# Patient Record
Sex: MALE | Race: WHITE | NOT HISPANIC OR LATINO | URBAN - METROPOLITAN AREA
[De-identification: names, ages, dates, MRNs, and addresses within clinical notes are randomized per-mention and may not be internally consistent; named-entity substitution may affect disease eponyms.]

---

## 2023-07-24 ENCOUNTER — OFFICE (OUTPATIENT)
Dept: URBAN - METROPOLITAN AREA CLINIC 10 | Facility: CLINIC | Age: 58
End: 2023-07-24

## 2023-07-24 VITALS
HEIGHT: 68 IN | SYSTOLIC BLOOD PRESSURE: 123 MMHG | DIASTOLIC BLOOD PRESSURE: 73 MMHG | WEIGHT: 136 LBS | HEART RATE: 56 BPM | OXYGEN SATURATION: 96 %

## 2023-07-24 DIAGNOSIS — D50.9 IRON DEFICIENCY ANEMIA, UNSPECIFIED: ICD-10-CM

## 2023-07-24 PROCEDURE — 99204 OFFICE O/P NEW MOD 45 MIN: CPT | Performed by: REGISTERED NURSE

## 2023-07-24 RX ORDER — POLYETHYLENE GLYCOL 3350, SODIUM SULFATE, SODIUM CHLORIDE, POTASSIUM CHLORIDE, ASCORBIC ACID, SODIUM ASCORBATE 140-9-5.2G
KIT ORAL
Qty: 1 | Refills: 0 | Status: COMPLETED
Start: 2023-07-24 | End: 2023-09-19

## 2023-07-24 NOTE — INTERFACERESULTNOTES
I called the patient to notify him of lab results.  There was no answer so I left him a voice mail.  His iron levels were slightly decreased.  I have sent in iron supplement to [facility] to be taken twice a day.  We will need to add an EGD to his colonoscopy.  I will discuss this once he returns my call

## 2023-07-24 NOTE — INTERFACERESULTNOTES
I called  the patient to review his lab results.   There was no answer so I left him a voicemail. I also want to follow-up make sure that he received notification on the iron supplements.  We will need to add an EGD to his colonoscopy for iron deficiency anemia.

## 2023-07-25 LAB
ANEMIA PROFILE A: BASO (ABSOLUTE): 0.1 X10E3/UL (ref 0–0.2)
ANEMIA PROFILE A: BASOS: 1 %
ANEMIA PROFILE A: EOS (ABSOLUTE): 0.1 X10E3/UL (ref 0–0.4)
ANEMIA PROFILE A: EOS: 1 %
ANEMIA PROFILE A: HEMATOCRIT: 40.1 % (ref 37.5–51)
ANEMIA PROFILE A: HEMOGLOBIN: 12.8 G/DL — LOW (ref 13–17.7)
ANEMIA PROFILE A: IMMATURE GRANS (ABS): 0 X10E3/UL (ref 0–0.1)
ANEMIA PROFILE A: IMMATURE GRANULOCYTES: 0 %
ANEMIA PROFILE A: IRON BIND.CAP.(TIBC): 302 UG/DL (ref 250–450)
ANEMIA PROFILE A: IRON SATURATION: 7 % — CRITICAL LOW (ref 15–55)
ANEMIA PROFILE A: IRON: 20 UG/DL — LOW (ref 38–169)
ANEMIA PROFILE A: LYMPHS (ABSOLUTE): 1.4 X10E3/UL (ref 0.7–3.1)
ANEMIA PROFILE A: LYMPHS: 26 %
ANEMIA PROFILE A: MCH: 28.9 PG (ref 26.6–33)
ANEMIA PROFILE A: MCHC: 31.9 G/DL (ref 31.5–35.7)
ANEMIA PROFILE A: MCV: 91 FL (ref 79–97)
ANEMIA PROFILE A: MONOCYTES(ABSOLUTE): 0.6 X10E3/UL (ref 0.1–0.9)
ANEMIA PROFILE A: MONOCYTES: 11 %
ANEMIA PROFILE A: NEUTROPHILS (ABSOLUTE): 3.2 X10E3/UL (ref 1.4–7)
ANEMIA PROFILE A: NEUTROPHILS: 61 %
ANEMIA PROFILE A: PLATELETS: 269 X10E3/UL (ref 150–450)
ANEMIA PROFILE A: RBC: 4.43 X10E6/UL (ref 4.14–5.8)
ANEMIA PROFILE A: RDW: 13.9 % (ref 11.6–15.4)
ANEMIA PROFILE A: RETICULOCYTE COUNT: 1.7 % (ref 0.6–2.6)
ANEMIA PROFILE A: UIBC: 282 UG/DL (ref 111–343)
ANEMIA PROFILE A: WBC: 5.3 X10E3/UL (ref 3.4–10.8)
VITAMIN B12 AND FOLATE: FOLATE (FOLIC ACID), SERUM: >20 NG/ML
VITAMIN B12 AND FOLATE: VITAMIN B12: 634 PG/ML (ref 232–1245)

## 2023-09-19 ENCOUNTER — AMBULATORY SURGICAL CENTER (OUTPATIENT)
Dept: URBAN - METROPOLITAN AREA SURGERY 1 | Facility: SURGERY | Age: 58
End: 2023-09-19
Payer: COMMERCIAL

## 2023-09-19 ENCOUNTER — AMBULATORY SURGICAL CENTER (OUTPATIENT)
Dept: URBAN - METROPOLITAN AREA SURGERY 1 | Facility: SURGERY | Age: 58
End: 2023-09-19

## 2023-09-19 ENCOUNTER — OFFICE (OUTPATIENT)
Dept: URBAN - METROPOLITAN AREA PATHOLOGY 12 | Facility: PATHOLOGY | Age: 58
End: 2023-09-19
Payer: COMMERCIAL

## 2023-09-19 VITALS
HEART RATE: 73 BPM | OXYGEN SATURATION: 98 % | SYSTOLIC BLOOD PRESSURE: 112 MMHG | SYSTOLIC BLOOD PRESSURE: 127 MMHG | SYSTOLIC BLOOD PRESSURE: 127 MMHG | SYSTOLIC BLOOD PRESSURE: 112 MMHG | OXYGEN SATURATION: 97 % | HEART RATE: 78 BPM | OXYGEN SATURATION: 98 % | HEART RATE: 78 BPM | HEART RATE: 79 BPM | DIASTOLIC BLOOD PRESSURE: 79 MMHG | RESPIRATION RATE: 17 BRPM | DIASTOLIC BLOOD PRESSURE: 80 MMHG | SYSTOLIC BLOOD PRESSURE: 127 MMHG | DIASTOLIC BLOOD PRESSURE: 94 MMHG | DIASTOLIC BLOOD PRESSURE: 79 MMHG | HEIGHT: 68 IN | SYSTOLIC BLOOD PRESSURE: 86 MMHG | HEART RATE: 79 BPM | RESPIRATION RATE: 17 BRPM | SYSTOLIC BLOOD PRESSURE: 147 MMHG | RESPIRATION RATE: 17 BRPM | RESPIRATION RATE: 18 BRPM | HEART RATE: 73 BPM | DIASTOLIC BLOOD PRESSURE: 85 MMHG | OXYGEN SATURATION: 98 % | RESPIRATION RATE: 18 BRPM | OXYGEN SATURATION: 96 % | DIASTOLIC BLOOD PRESSURE: 80 MMHG | DIASTOLIC BLOOD PRESSURE: 67 MMHG | OXYGEN SATURATION: 96 % | HEIGHT: 68 IN | WEIGHT: 130 LBS | HEART RATE: 98 BPM | DIASTOLIC BLOOD PRESSURE: 80 MMHG | DIASTOLIC BLOOD PRESSURE: 94 MMHG | OXYGEN SATURATION: 97 % | TEMPERATURE: 98.5 F | DIASTOLIC BLOOD PRESSURE: 67 MMHG | SYSTOLIC BLOOD PRESSURE: 147 MMHG | WEIGHT: 130 LBS | DIASTOLIC BLOOD PRESSURE: 79 MMHG | HEART RATE: 79 BPM | DIASTOLIC BLOOD PRESSURE: 67 MMHG | HEART RATE: 98 BPM | DIASTOLIC BLOOD PRESSURE: 85 MMHG | HEART RATE: 73 BPM | OXYGEN SATURATION: 97 % | TEMPERATURE: 98.5 F | HEART RATE: 78 BPM | TEMPERATURE: 98.5 F | SYSTOLIC BLOOD PRESSURE: 86 MMHG | SYSTOLIC BLOOD PRESSURE: 147 MMHG | DIASTOLIC BLOOD PRESSURE: 94 MMHG | RESPIRATION RATE: 18 BRPM | OXYGEN SATURATION: 96 % | WEIGHT: 130 LBS | SYSTOLIC BLOOD PRESSURE: 86 MMHG | HEIGHT: 68 IN | SYSTOLIC BLOOD PRESSURE: 112 MMHG | DIASTOLIC BLOOD PRESSURE: 85 MMHG | HEART RATE: 98 BPM

## 2023-09-19 DIAGNOSIS — D50.9 IRON DEFICIENCY ANEMIA, UNSPECIFIED: ICD-10-CM

## 2023-09-19 DIAGNOSIS — D12.5 BENIGN NEOPLASM OF SIGMOID COLON: ICD-10-CM

## 2023-09-19 DIAGNOSIS — K63.5 POLYP OF COLON: ICD-10-CM

## 2023-09-19 DIAGNOSIS — D12.0 BENIGN NEOPLASM OF CECUM: ICD-10-CM

## 2023-09-19 DIAGNOSIS — D12.2 BENIGN NEOPLASM OF ASCENDING COLON: ICD-10-CM

## 2023-09-19 DIAGNOSIS — Z12.11 ENCOUNTER FOR SCREENING FOR MALIGNANT NEOPLASM OF COLON: ICD-10-CM

## 2023-09-19 DIAGNOSIS — K31.89 OTHER DISEASES OF STOMACH AND DUODENUM: ICD-10-CM

## 2023-09-19 PROBLEM — D53.9 UNEXPLAINED IRON DEFICIENCY ANEMIA: Status: ACTIVE | Noted: 2023-09-19

## 2023-09-19 PROCEDURE — 43239 EGD BIOPSY SINGLE/MULTIPLE: CPT | Mod: 51 | Performed by: INTERNAL MEDICINE

## 2023-09-19 PROCEDURE — 45385 COLONOSCOPY W/LESION REMOVAL: CPT | Mod: 33 | Performed by: INTERNAL MEDICINE

## 2023-09-19 PROCEDURE — 88305 TISSUE EXAM BY PATHOLOGIST: CPT

## 2023-09-19 RX ADMIN — PROPOFOL 250 MG: 10 INJECTION, EMULSION INTRAVENOUS at 11:27

## 2023-09-21 LAB
GASTRO ONE PATHOLOGY: PDF REPORT: (no result)